# Patient Record
Sex: FEMALE | Race: WHITE | URBAN - METROPOLITAN AREA
[De-identification: names, ages, dates, MRNs, and addresses within clinical notes are randomized per-mention and may not be internally consistent; named-entity substitution may affect disease eponyms.]

---

## 2024-01-05 NOTE — H&P
[facility] - Gastroenterology                                                                                                               Reason for consult: eval    Requesting physician or provider: No primary care provider on file.    Chief Complaint   Patient presents with    Nausea    Abdominal Pain       HPI:   Chrissy Luna is a 28 year old year-old female without significant PMHx:     she is here today for evaluation right sided abd pain    Sx started approx last spring. Was after drinking beer and next day woke up w/ pain. Tried to change her diet and sx improved.    Started vitamins and made sx worse  Was having nausea in am and after fatty/greasy meals. No vomiting.  Went to ed in sept    Ct a/p w/o oral contrast 9/2023    Mild fecal material throughout the colon  No etiology for pts symptoms    Ultrasound 9/2023  Unremarkable ultrasound of uterus and ovaries    Labs 9/2023  Cbc, cmp, lipase unremarkable    Sx slightly improved. In dec started vit and pain returned  Pain in rlq.  Is intermittent  Improves with bm  Worse 1 h after eating  No dietary triggers.   Has nausea with pain  No vomiting.     she moves her bowels daily w/ straining at times. No diarrhea. No brbpr and/or melena.     she denies acid reflux and/or heartburn. she denies dysphagia, odynophagia and/or globus.  she denies recent change in appetite and/or unintentional weight loss.    NSAIDS: no  Tobacco: no  Alcohol: no  Marijuana: no  Illicit drugs: no    No FH GI malignancy, ibd, celiac    No history of adverse reaction to sedation  No AWILDA  No anticoagulants  No pacemaker/defibrillator  No pain medications and/or sleep aides      Last colonoscopy: no  Last EGD: no    Wt Readings from Last 6 Encounters:   01/11/24 169 lb (76.7 kg)        History, Medications, Allergies, ROS:      No past medical history on file.   Past Surgical History:   Procedure Laterality Date    APPENDECTOMY        Family  Hx: History reviewed. No pertinent family history.   Social History:   Social History     Socioeconomic History    Marital status: Single   Tobacco Use    Smoking status: Never    Smokeless tobacco: Never   Vaping Use    Vaping Use: Never used   Substance and Sexual Activity    Alcohol use: Never    Drug use: Never        Medications (Active prior to today's visit):  Current Outpatient Medications   Medication Sig Dispense Refill    PEG 3350-KCl-Na Bicarb-NaCl (TRILYTE) 420 g Oral Recon Soln Take prep as directed by gastro office. May substitute with Trilyte/generic equivalent if needed. 4000 mL 0       Allergies:  No Known Allergies    ROS:   CONSTITUTIONAL: negative for fevers, chills, sweats and weight loss  EYES Negative for red eyes, yellow eyes, changes in vision  HEENT: Negative for dysphagia and hoarseness  RESPIRATORY: Negative for cough and shortness of breath  CARDIOVASCULAR: Negative for chest pain, palpitations  GASTROINTESTINAL: See HPI  GENITOURINARY: Negative for dysuria and frequency  MUSCULOSKELETAL: Negative for arthralgias and myalgias  NEUROLOGICAL: Negative for dizziness and headaches  BEHAVIOR/PSYCH: Negative for anxiety and poor appetite    PHYSICAL EXAM:   Blood pressure (!) 139/91, pulse 84, height 5' 0.4\" (1.534 m), weight 169 lb (76.7 kg), last menstrual period 12/20/2023.    GEN: WD/WN, NAD  HEENT: Supple symmetrical, trachea midline  CV: RRR, the extremities are warm and well perfused   LUNGS: No increased work of breathing  ABDOMEN: No scars, normal bowel sounds, soft, non-tender, non-distended no rebound or guarding, no masses, no hepatomegaly  MSK: No redness, no warmth, no swelling of joints  SKIN: No jaundice, no erythema, no rashes  HEMATOLOGIC: No bleeding, no bruising  NEURO: Alert and interactive, normal gait    Labs/Imaging/Procedures:     Ct a/p 9/2023  Impression      1.   Punctate nonobstructing calculus in the left kidney.  2.   Otherwise unremarkable noncontrast  appearance of the kidneys and upper collecting systems. No  further calcification, dilation, or active obstructive uropathy.  3.  Mild nonspecific thickening of the urinary bladder wall. Please correlate for any possible cystitis.  4.  The appendix is not identified. No dominant inflammatory abnormality in this territory by this  technique.    SIGNED BY: Buster Pierce  9/19/2023 8:26 AM  Narrative    CT ABDOMEN & PELVIS WO IV CONTRAST    CLINICAL INDICATION: 27 years-old-Female; R flank pain    COMPARISON:  None available.    TECHNIQUE: Multidetector CT sections were obtained without administration of intravenous contrast.  Sagittal and coronal reconstructions were obtained. CT dose reduction techniques utilized.    FINDINGS:      Where applicable, Fleischner criteria and/or consensus guidelines (Moya et al. Radiology 2017) are  utilized or considered for follow-up recommendations.    PARTIALLY VISUALIZED LUNG BASES: No threshold suspicious abnormality..    BOWEL: Oral contrast not administered. No bowel dilation or obstruction. Mild fecal material throughout  the colon. I cannot confidently identify the appendix. No periappendiceal stranding in this territory..    ABDOMEN and PELVIS:    LIVER AND BILIARY SYSTEM: The liver is unremarkable. No intra or extrahepatic biliary ductal dilatation.    PANCREAS: Unremarkable.    SPLEEN: Unremarkable.    ADRENAL GLANDS: Unremarkable.    GENITOURINARY ORGANS: Noncontrast evaluation. 3 mm nonobstructing calculus in the lower pole of the left  kidney. No collecting system dilation on either side. No active obstructive uropathy by CT.    Mild  nonspecific thickening of the urinary bladder wall may be exaggerated by underdistention, nonspecific.    PELVIS: No further significant findings.    VASCULATURE: No significant abnormality for technique.    LYMPH NODES, MESENTERY, ABDOMINAL SPACES: No further significant findings beyond any described above.    BONES: No further  significant findings.    Ultrasound abdomen 9/2023:  Impression    1. Unremarkable sonographic appearance of the uterus.  2. Unremarkable sonographic appearance of the ovaries.    SIGNED BY: Keith Go MD  9/19/2023 11:08 AM  Narrative    US PELVIC NON-OBSTETRIC, US NON-OB TRANSVAGINAL, US ABDOMEN DOPPLER COMPLETE    CLINICAL INDICATION: 27 years-old-Female; right flank pain and pelvic pain.    COMPARISON:  None available.    TECHNIQUE:  Transabdominal and transvaginal pelvic ultrasound was performed using multiple curved array  probes. Color and spectral Doppler imaging was performed of the bilateral ovaries.    FINDINGS:  The uterus measures 7.1 x 2.0 x 4.2 cm.  There are no focal myometrial lesions.  There is no  asymmetric flow on spontaneous color flow imaging within the myometrial tissue.  The endometrial complex  is normal in thickness.  On transabdominal evaluation the endometrium measures 0.7 cm.  Endovaginally,  the endometrium measures 0.8 cm. A nabothian cyst measuring up to 8 mm is noted    RIGHT OVARY: Transabdominally, the RIGHT ovary measures 2.1 x 1.4 x 2.4 cm. Endovaginally, the RIGHT  ovary measures 3.8 x 2.1 x 3.3 cm. The right ovary demonstrates an unremarkable sonographic appearance.    LEFT OVARY: Transabdominally, the LEFT ovary measures 3.0 x 1.7 x 2.6 cm. Endovaginally, the LEFT ovary  measures 2.5 x 2.3 x 2.2 cm. The left ovary demonstrates an unremarkable sonographic appearance.    Normal appearing follicles are identified in both ovaries. There is spontaneous color flow overlying both  ovaries. Arterial and venous waveforms are noted in the bilateral ovaries on spectral Doppler imaging.    No adnexal masses are identified.  There is no free fluid.  The partially visualized urinary bladder,  which is incidentally imaged, is unremarkable.    ASSESSMENT/PLAN:   Chrissy Luna is a 28 year old year-old female without significant PMHx:     #right sided abd  pain  #constipation  #nausea  Here today for eval of her rlq abd pain. Ct, ultrasound pevis, labs w/o significant finding. S/p appy age 13. Has constipation, nausea and think pain may be related.  No vomiting, unintentional weight loss, brbpr and/or melena. No fhx gi malignancy, ibd, celiac.     -hp testing  -fibercon or citrucel in pm  -miralax in am and adjust as needed    1. Schedule colonoscopy with iv or MAC w/ Dr. Juan or Dr. Morton [Diagnosis: #right sided abd pain  #constipation  #nausea]    2.  bowel prep from pharmacy (split trilyte)    3. Continue all medications as normal for your procedure.    4. Read all bowel prep instructions carefully. Bowel prep instructions can also be found online at:  [email or web address]     5. AVOID seeds, nuts, popcorn, raw fruits and vegetables for 3 days before procedure    6. You MAY need to go for COVID testing 72 hours before procedure. The testing team will call you a few days before your procedure to discuss with you if testing is required. If you are asked to go for COVID testing and do not completed the test, the procedure cannot be performed.     7. If you start any NEW medication after your visit today, please notify us. Certain medications (like iron or weight loss medications) will need to be held before the procedure, or the procedure cannot be performed safely.      Orders This Visit:  Orders Placed This Encounter   Procedures    Helicobacter Pylori Breath Test, Adult       Meds This Visit:  Requested Prescriptions     Signed Prescriptions Disp Refills    PEG 3350-KCl-Na Bicarb-NaCl (TRILYTE) 420 g Oral Recon Soln 4000 mL 0     Sig: Take prep as directed by gastro office. May substitute with Trilyte/generic equivalent if needed.       Imaging & Referrals:  None    ENDOSCOPIC RISK BENEFIT DISCUSSION: I described the procedure in great detail with the patient. I discussed the risks and benefits, including but not limited to: bleeding, perforation,  infection, anesthesia complications, and even death. Patient will be NPO after midnight and will have a person physically present at time of pick-up to drive patient home. Patient verbalized understanding and agrees to proceed with procedure as planned.    Keila Magana, APRN   1/11/2024        This note was partially prepared using Dragon Medical voice recognition dictation software. As a result, errors may occur. When identified, these errors have been corrected. While every attempt is made to correct errors during dictation, discrepancies may still exist.

## 2024-01-11 ENCOUNTER — OFFICE VISIT (OUTPATIENT)
Dept: GASTROENTEROLOGY | Facility: CLINIC | Age: 29
End: 2024-01-11

## 2024-01-11 VITALS
BODY MASS INDEX: 32.75 KG/M2 | SYSTOLIC BLOOD PRESSURE: 139 MMHG | WEIGHT: 169 LBS | DIASTOLIC BLOOD PRESSURE: 91 MMHG | HEART RATE: 84 BPM | HEIGHT: 60.4 IN

## 2024-01-11 DIAGNOSIS — R11.0 NAUSEA: ICD-10-CM

## 2024-01-11 DIAGNOSIS — R10.31 RIGHT LOWER QUADRANT PAIN: Primary | ICD-10-CM

## 2024-01-11 DIAGNOSIS — K59.00 CONSTIPATION, UNSPECIFIED CONSTIPATION TYPE: ICD-10-CM

## 2024-01-11 PROCEDURE — 3008F BODY MASS INDEX DOCD: CPT | Performed by: NURSE PRACTITIONER

## 2024-01-11 PROCEDURE — 3075F SYST BP GE 130 - 139MM HG: CPT | Performed by: NURSE PRACTITIONER

## 2024-01-11 PROCEDURE — 99204 OFFICE O/P NEW MOD 45 MIN: CPT | Performed by: NURSE PRACTITIONER

## 2024-01-11 PROCEDURE — 3080F DIAST BP >= 90 MM HG: CPT | Performed by: NURSE PRACTITIONER

## 2024-01-11 RX ORDER — POLYETHYLENE GLYCOL 3350, SODIUM CHLORIDE, SODIUM BICARBONATE, POTASSIUM CHLORIDE 420; 11.2; 5.72; 1.48 G/4L; G/4L; G/4L; G/4L
POWDER, FOR SOLUTION ORAL
Qty: 4000 ML | Refills: 0 | Status: SHIPPED | OUTPATIENT
Start: 2024-01-11

## 2024-01-11 NOTE — PATIENT INSTRUCTIONS
-hp testing  -miralax in am and adjust as needed  -fibercon or citrucel    1. Schedule colonoscopy with iv or MAC w/ Dr. Juan or Dr. Morton [Diagnosis: #right sided abd pain  #constipation  #nausea]    2.  bowel prep from pharmacy (split trilyte)    3. Continue all medications as normal for your procedure.    4. Read all bowel prep instructions carefully. Bowel prep instructions can also be found online at:  [email or web address]     5. AVOID seeds, nuts, popcorn, raw fruits and vegetables for 3 days before procedure    6. You MAY need to go for COVID testing 72 hours before procedure. The testing team will call you a few days before your procedure to discuss with you if testing is required. If you are asked to go for COVID testing and do not completed the test, the procedure cannot be performed.     7. If you start any NEW medication after your visit today, please notify us. Certain medications (like iron or weight loss medications) will need to be held before the procedure, or the procedure cannot be performed safely.

## 2024-01-12 ENCOUNTER — TELEPHONE (OUTPATIENT)
Facility: CLINIC | Age: 29
End: 2024-01-12

## 2024-01-12 NOTE — TELEPHONE ENCOUNTER
Spoke to patient    She wanted to know why the lab work was ordered.  I explained Keila's recommendations from the OV yesterday.    I discussed how to prepare for the H pylori test, where to get it done, and reasons why we test for it.    Pt verbalized understanding.  Pt had no further questions

## 2024-01-15 ENCOUNTER — TELEPHONE (OUTPATIENT)
Facility: CLINIC | Age: 29
End: 2024-01-15

## 2024-01-15 DIAGNOSIS — R10.31 RIGHT LOWER QUADRANT PAIN: Primary | ICD-10-CM

## 2024-01-15 NOTE — TELEPHONE ENCOUNTER
Scheduled for:  Colonoscopy [id number], [id number]  Provider Name:    Date: 4/16/2024   Location: [facility]   Sedation:  MAC  Time:  2:00 pm ,(pt is aware that [facility] will call the day before to confirm arrival time)  Prep: Trilyte   Meds/Allergies Reconciled?: Keila/NP reviewed.  Diagnosis with codes:  Lower Right Side Pain R10.31, Constipation K59.00, Nausea R11.0  Was patient informed to call insurance with codes (Y/N):  Yes   Referral sent?:  Referral was sent at the time of electronic surgical scheduling.  [facility] or [facility] notified?:   Electronic case request was sent to [facility] via [employer].  Medication Orders:  N/A  Misc Orders:  N/A     Further instructions given by staff:  I discussed the prep instructions with the patient which she verbally understood. Provided patient with prep instructions and cancellation policy at the time of office visit.

## 2024-02-07 ENCOUNTER — TELEPHONE (OUTPATIENT)
Facility: CLINIC | Age: 29
End: 2024-02-07

## 2024-02-07 ENCOUNTER — LAB ENCOUNTER (OUTPATIENT)
Dept: LAB | Age: 29
End: 2024-02-07
Attending: NURSE PRACTITIONER
Payer: COMMERCIAL

## 2024-02-07 DIAGNOSIS — K59.00 CONSTIPATION, UNSPECIFIED CONSTIPATION TYPE: ICD-10-CM

## 2024-02-07 DIAGNOSIS — R11.0 NAUSEA: ICD-10-CM

## 2024-02-07 DIAGNOSIS — R10.31 RIGHT LOWER QUADRANT PAIN: ICD-10-CM

## 2024-02-07 PROCEDURE — 83013 H PYLORI (C-13) BREATH: CPT

## 2024-02-07 NOTE — TELEPHONE ENCOUNTER
Keila,   Pt is scheduled for a colon on 4/16/24. Pt asking to have EGD added r/t abdominal pain. Are you agreeable?  Thanks,  Lisa    My Note Signed3:18 PM     Addend     Delete     Copy     From: Chrissy Luna  To: Keila Magana  Sent: 2/6/2024  9:40 PM CST  Subject: Additional test- gastroscopy    I would like to found out if I can benefit to do gastroscopy at the same time during the colonoscopy. I feel stomach pains sometimes. Please let me know . Thank you.

## 2024-02-08 LAB — H PYLORI BREATH TEST: POSITIVE

## 2024-02-12 ENCOUNTER — TELEPHONE (OUTPATIENT)
Dept: GASTROENTEROLOGY | Facility: CLINIC | Age: 29
End: 2024-02-12

## 2024-02-12 ENCOUNTER — TELEPHONE (OUTPATIENT)
Facility: CLINIC | Age: 29
End: 2024-02-12

## 2024-02-12 ENCOUNTER — PATIENT MESSAGE (OUTPATIENT)
Dept: GASTROENTEROLOGY | Facility: CLINIC | Age: 29
End: 2024-02-12

## 2024-02-12 DIAGNOSIS — A04.8 HELICOBACTER PYLORI (H. PYLORI) INFECTION: Primary | ICD-10-CM

## 2024-02-12 RX ORDER — CLARITHROMYCIN 500 MG/1
500 TABLET, COATED ORAL 2 TIMES DAILY
Qty: 28 TABLET | Refills: 0 | Status: SHIPPED | OUTPATIENT
Start: 2024-02-12 | End: 2024-02-26

## 2024-02-12 RX ORDER — AMOXICILLIN 500 MG/1
1000 TABLET, FILM COATED ORAL 2 TIMES DAILY
Qty: 56 TABLET | Refills: 0 | Status: SHIPPED | OUTPATIENT
Start: 2024-02-12 | End: 2024-02-26

## 2024-02-12 RX ORDER — AMOXICILLIN 500 MG/1
1000 TABLET, FILM COATED ORAL 2 TIMES DAILY
Qty: 56 TABLET | Refills: 0 | Status: SHIPPED | OUTPATIENT
Start: 2024-02-12 | End: 2024-02-12

## 2024-02-12 RX ORDER — CLARITHROMYCIN 500 MG/1
500 TABLET, COATED ORAL 2 TIMES DAILY
Qty: 28 TABLET | Refills: 0 | Status: SHIPPED | OUTPATIENT
Start: 2024-02-12 | End: 2024-02-12

## 2024-02-12 RX ORDER — OMEPRAZOLE 20 MG/1
20 CAPSULE, DELAYED RELEASE ORAL
Qty: 28 CAPSULE | Refills: 0 | Status: SHIPPED | OUTPATIENT
Start: 2024-02-12 | End: 2024-02-26

## 2024-02-12 RX ORDER — OMEPRAZOLE 20 MG/1
20 CAPSULE, DELAYED RELEASE ORAL
Qty: 28 CAPSULE | Refills: 0 | Status: SHIPPED | OUTPATIENT
Start: 2024-02-12 | End: 2024-02-12

## 2024-02-12 NOTE — TELEPHONE ENCOUNTER
From: Chrissy Luna  To: Keila Magana  Sent: 2/12/2024 10:43 AM CST  Subject: Helicobacter Pilory test result     Hi. I have a positive test on my helicobacter breath test but I still haven’t received any prescriptions from you. I feel not good and I would like to start my treatment as soon as possible. Looking forward to hearing from you soon

## 2024-02-12 NOTE — TELEPHONE ENCOUNTER
Recall h pylori eradication testing in 8 weeks per APN. Message sent to pt outreach.    RN called and spoke to pt. Discussed ordered meds; all were sent to [facility] in [place] so RN changed orders to local [facility]. Informed pt that we will contact her in approx 8 weeks when due for eradication testing. Pt verbalized understanding.

## 2024-02-12 NOTE — TELEPHONE ENCOUNTER
See TE from 2/12/24 r/t h pylori treatment. Pt states she spoke to APN regarding adding EGD to already scheduled colonoscopy; pt states Keila recommends assessing symptoms after h pylori treatment is completed before deciding to add EGD or not.

## 2024-02-12 NOTE — TELEPHONE ENCOUNTER
I spoke with the pt-plan to treat hp and complete eradication testing as ordered.  If on-going sx after confirmation of eradication consider need for egd.  Pt is in agreement with the plan

## 2024-02-21 ENCOUNTER — PATIENT MESSAGE (OUTPATIENT)
Dept: GASTROENTEROLOGY | Facility: CLINIC | Age: 29
End: 2024-02-21

## 2024-02-22 NOTE — TELEPHONE ENCOUNTER
From: Chrissy Luna  To: Keila Magana  Sent: 2/21/2024 6:41 PM CST  Subject: After taking medication feel not good     Hi. I started taking medication 4 days ago. I feel bitter taste in my mouth all the time, nausea , weakness and my poop is yellow. Is it normal for people who is be treated like this or no?

## 2024-02-22 NOTE — TELEPHONE ENCOUNTER
Ravi Pedraza,     Please advise below.    Spoke with patient, name/ verified.    Stated she started to triple therapy for h. Pylori last Saturday.     Last Monday, she woke up w/ muscle weakness, denies dizziness.    Denies any ill contacts    Has normal soft BM 3x/day, denies brbpr/melena, stools are yellow and I informed pt that this could be side effects from the antibiotics along w/ nausea. She denies vomiting.  Pt's normal bm is once a day prior, recommended taking OTC probiotics while on this triple therapy.      Has stomach upset- instructed pt to take antibiotics w/ food to minimize stomach upset.     Denies, fever/chills, chest pain, shortness of breath or any other symptoms.    Instructed to take fluids for adequate hydration.     In the interim, if symptoms get worse, instructed pt to call us back or go to ER.     Pt verbalized understanding.     Of note, instructed pt to call GI office vs sending [employer] message when having symptoms for immediate attention.

## 2024-02-22 NOTE — TELEPHONE ENCOUNTER
I contacted the pt.     She reports fatigue with therapy.  No instability, dizziness. No rash. No sob.   No vomiting, diarrhea, brbpr, melena.    She is tolerating gen diet and fluids.       We discussed considering alternate treatment regimen, however most contain metronidazole and tends to cause nausea, abd discomfort.    She is electing to continue current therapy regimen.    Plan:  Probiotic  Continue triple therapy with food  Stop therapy and contact office vs consider er if condition decline    She verbalizes understanding and is in agreement with the plan.

## 2024-03-12 ENCOUNTER — PATIENT MESSAGE (OUTPATIENT)
Dept: GASTROENTEROLOGY | Facility: CLINIC | Age: 29
End: 2024-03-12

## 2024-03-12 DIAGNOSIS — R14.0 BLOATING: Primary | ICD-10-CM

## 2024-03-12 DIAGNOSIS — R10.84 GENERALIZED ABDOMINAL PAIN: ICD-10-CM

## 2024-03-12 NOTE — TELEPHONE ENCOUNTER
From: Chrissy Luna  To: Keila Magana  Sent: 3/12/2024 4:02 PM CDT  Subject: Pain in stomach after medication     Hello! I finished taking medication against h.pylory March 1st. After that I started feeling pain in my stomach , bloating after every meal, burping . What should I do?

## 2024-03-28 ENCOUNTER — TELEPHONE (OUTPATIENT)
Facility: CLINIC | Age: 29
End: 2024-03-28

## 2024-03-28 NOTE — TELEPHONE ENCOUNTER
1st,overdue reminder letter mailed out to patient   Lab order :  Orders Placed on 2/12/2024   Due 8 weeks ( 4/12/2024)  Helicobacter Pylori Breath Test, Adult

## 2024-03-29 ENCOUNTER — NURSE ONLY (OUTPATIENT)
Dept: LAB | Age: 29
End: 2024-03-29
Attending: NURSE PRACTITIONER
Payer: COMMERCIAL

## 2024-03-29 DIAGNOSIS — A04.8 HELICOBACTER PYLORI (H. PYLORI) INFECTION: ICD-10-CM

## 2024-03-29 PROCEDURE — 83013 H PYLORI (C-13) BREATH: CPT

## 2024-03-31 LAB — H PYLORI BREATH TEST: NEGATIVE

## 2024-04-01 ENCOUNTER — TELEPHONE (OUTPATIENT)
Dept: GASTROENTEROLOGY | Facility: CLINIC | Age: 29
End: 2024-04-01

## 2024-04-01 NOTE — TELEPHONE ENCOUNTER
Hp neg 3/29/24 after treatment.     She reports rlq pain worse after eating.  Plan for c-scope as scheduled.

## 2024-04-15 ENCOUNTER — TELEPHONE (OUTPATIENT)
Facility: CLINIC | Age: 29
End: 2024-04-15

## 2024-04-15 NOTE — TELEPHONE ENCOUNTER
RN called and spoke to pt. Pt received printed script for bowel prep. Pt states she brought the script to pharmacy and they told her they don't have that medication available.     RN e-scribed 4 L bowel prep to [facility] in [place]. Instructed pt to call GI office if she has any issues getting med from pharmacy today. Verified that pt has written instructions on how to prepare for procedure.

## 2024-04-23 ENCOUNTER — HOSPITAL ENCOUNTER (OUTPATIENT)
Dept: ULTRASOUND IMAGING | Age: 29
Discharge: HOME | End: 2024-04-23
Attending: NURSE PRACTITIONER
Payer: COMMERCIAL

## 2024-04-23 ENCOUNTER — TELEPHONE (OUTPATIENT)
Dept: GASTROENTEROLOGY | Facility: CLINIC | Age: 29
End: 2024-04-23

## 2024-04-23 DIAGNOSIS — R14.0 BLOATING: ICD-10-CM

## 2024-04-23 DIAGNOSIS — R10.84 GENERALIZED ABDOMINAL PAIN: ICD-10-CM

## 2024-04-23 DIAGNOSIS — N20.0 NEPHROLITHIASIS: Primary | ICD-10-CM

## 2024-04-23 PROCEDURE — 76700 US EXAM ABDOM COMPLETE: CPT | Performed by: NURSE PRACTITIONER

## 2024-04-23 NOTE — TELEPHONE ENCOUNTER
Ultrasound abd complete 4/23/24:    Impression  CONCLUSION:  Mild right hydronephrosis.  Otherwise no acute sonographic findings.  Consider further evaluation with CT, as clinically indicated.     LOCATION:  [facility]     Dictated by (CST): Andrews Ibrahim MD on 4/23/2024 at 8:09 AM      Renal function 2/2024 normal    She denies flank pain, change in urination, fever/chills.  Mild, transient abd pain and think could be related  She does not have a primary care.    I advised repeat lab testing and recommended she elect a PCP.  She was given a referral to urology for f/u and advised she schedule consult as soon as possible for further work-up. Er if condition decline    She verbalizes understanding and is in agreement with the plan.

## 2024-04-24 ENCOUNTER — LAB ENCOUNTER (OUTPATIENT)
Dept: LAB | Age: 29
End: 2024-04-24
Attending: NURSE PRACTITIONER
Payer: COMMERCIAL

## 2024-04-24 DIAGNOSIS — N20.0 NEPHROLITHIASIS: ICD-10-CM

## 2024-04-24 LAB
ALBUMIN SERPL-MCNC: 4.4 G/DL (ref 3.2–4.8)
ALBUMIN/GLOB SERPL: 1.6 {RATIO} (ref 1–2)
ALP LIVER SERPL-CCNC: 56 U/L
ALT SERPL-CCNC: 14 U/L
ANION GAP SERPL CALC-SCNC: 6 MMOL/L (ref 0–18)
AST SERPL-CCNC: 20 U/L (ref ?–34)
BASOPHILS # BLD AUTO: 0.04 X10(3) UL (ref 0–0.2)
BASOPHILS NFR BLD AUTO: 0.7 %
BILIRUB SERPL-MCNC: 0.3 MG/DL (ref 0.3–1.2)
BUN BLD-MCNC: 10 MG/DL (ref 9–23)
BUN/CREAT SERPL: 13.2 (ref 10–20)
CALCIUM BLD-MCNC: 9.2 MG/DL (ref 8.7–10.4)
CHLORIDE SERPL-SCNC: 107 MMOL/L (ref 98–112)
CO2 SERPL-SCNC: 27 MMOL/L (ref 21–32)
CREAT BLD-MCNC: 0.76 MG/DL
DEPRECATED RDW RBC AUTO: 37.1 FL (ref 35.1–46.3)
EGFRCR SERPLBLD CKD-EPI 2021: 108 ML/MIN/1.73M2 (ref 60–?)
EOSINOPHIL # BLD AUTO: 0.09 X10(3) UL (ref 0–0.7)
EOSINOPHIL NFR BLD AUTO: 1.6 %
ERYTHROCYTE [DISTWIDTH] IN BLOOD BY AUTOMATED COUNT: 12.1 % (ref 11–15)
FASTING STATUS PATIENT QL REPORTED: YES
GLOBULIN PLAS-MCNC: 2.7 G/DL (ref 2.8–4.4)
GLUCOSE BLD-MCNC: 92 MG/DL (ref 70–99)
HCT VFR BLD AUTO: 38.8 %
HGB BLD-MCNC: 13 G/DL
IMM GRANULOCYTES # BLD AUTO: 0.01 X10(3) UL (ref 0–1)
IMM GRANULOCYTES NFR BLD: 0.2 %
LYMPHOCYTES # BLD AUTO: 2.8 X10(3) UL (ref 1–4)
LYMPHOCYTES NFR BLD AUTO: 49.7 %
MCH RBC QN AUTO: 28.6 PG (ref 26–34)
MCHC RBC AUTO-ENTMCNC: 33.5 G/DL (ref 31–37)
MCV RBC AUTO: 85.5 FL
MONOCYTES # BLD AUTO: 0.49 X10(3) UL (ref 0.1–1)
MONOCYTES NFR BLD AUTO: 8.7 %
NEUTROPHILS # BLD AUTO: 2.2 X10 (3) UL (ref 1.5–7.7)
NEUTROPHILS # BLD AUTO: 2.2 X10(3) UL (ref 1.5–7.7)
NEUTROPHILS NFR BLD AUTO: 39.1 %
OSMOLALITY SERPL CALC.SUM OF ELEC: 289 MOSM/KG (ref 275–295)
PLATELET # BLD AUTO: 327 10(3)UL (ref 150–450)
POTASSIUM SERPL-SCNC: 4.2 MMOL/L (ref 3.5–5.1)
PROT SERPL-MCNC: 7.1 G/DL (ref 5.7–8.2)
RBC # BLD AUTO: 4.54 X10(6)UL
SODIUM SERPL-SCNC: 140 MMOL/L (ref 136–145)
WBC # BLD AUTO: 5.6 X10(3) UL (ref 4–11)

## 2024-04-24 PROCEDURE — 36415 COLL VENOUS BLD VENIPUNCTURE: CPT

## 2024-04-24 PROCEDURE — 85025 COMPLETE CBC W/AUTO DIFF WBC: CPT

## 2024-04-24 PROCEDURE — 80053 COMPREHEN METABOLIC PANEL: CPT

## 2024-04-26 ENCOUNTER — OFFICE VISIT (OUTPATIENT)
Dept: SURGERY | Facility: CLINIC | Age: 29
End: 2024-04-26
Payer: COMMERCIAL

## 2024-04-26 DIAGNOSIS — R35.0 URINARY FREQUENCY: ICD-10-CM

## 2024-04-26 DIAGNOSIS — N13.30 HYDRONEPHROSIS, UNSPECIFIED HYDRONEPHROSIS TYPE: Primary | ICD-10-CM

## 2024-04-26 DIAGNOSIS — R31.29 MICROHEMATURIA: ICD-10-CM

## 2024-04-26 LAB
APPEARANCE: CLEAR
BILIRUBIN: NEGATIVE
GLUCOSE (URINE DIPSTICK): NEGATIVE MG/DL
KETONES (URINE DIPSTICK): NEGATIVE MG/DL
LEUKOCYTES: NEGATIVE
MULTISTIX LOT#: ABNORMAL NUMERIC
NITRITE, URINE: NEGATIVE
PH, URINE: 6.5 (ref 4.5–8)
PROTEIN (URINE DIPSTICK): NEGATIVE MG/DL
SPECIFIC GRAVITY: 1.01 (ref 1–1.03)
URINE-COLOR: YELLOW
UROBILINOGEN,SEMI-QN: 0.2 MG/DL (ref 0–1.9)

## 2024-04-26 PROCEDURE — 99204 OFFICE O/P NEW MOD 45 MIN: CPT

## 2024-04-26 PROCEDURE — 81003 URINALYSIS AUTO W/O SCOPE: CPT

## 2024-04-26 NOTE — PROGRESS NOTES
[facility]    Urology Consult Note    History of Present Illness:   Patient is a(n) 28 year old female who presents for right hydronephrosis.    Pt reports increasing right flank pain in the past 6 mos with urinary frequency q2-3h. Urinary frequency worsens after dinner before bed to q30min or so. Nocturia only 1x/night. Her baseline is q3-4h. She feels flank pain worsens when she took prep recently for colonoscopy.     She was first seen about this 9/2023. At the time, presented with right flank pain with radiation into the RLQ. CT A/P done at this time showed punctate nonobstructing stones in the left kidney. No dilation or obstruction noted at that time.     She was found to have UTI 1.5mos ago with obgyne and tx with abx although I cannot see any culture results.     Most recent US abd ordered by PCP showed mild right sided hydro. Currently still having persistent pain and urinary frequency.     UA today trace intact blood.     Drinks 2L water a day, 1L black tea, a couple sodas per wk. No fhx of  cx. No personal hx of passing stones. No fhx of stones. Never smoker.   HISTORY:  No past medical history on file.   Past Surgical History:   Procedure Laterality Date    Appendectomy      Colonoscopy N/A 4/16/2024    Procedure: COLONOSCOPY;  Surgeon: Marcio Morton MD;  Location: [facility] MAIN OR      No family history on file.   Social History:   Social History     Socioeconomic History    Marital status: Single   Tobacco Use    Smoking status: Never    Smokeless tobacco: Never   Vaping Use    Vaping status: Never Used   Substance and Sexual Activity    Alcohol use: Never    Drug use: Never     Social Determinants of Health     Financial Resource Strain: Low Risk  (4/24/2024)    Received from [facility]    Overall Financial Resource Strain (CARDIA)     Difficulty of Paying Living Expenses: Not very hard   Food Insecurity: No Food Insecurity (4/24/2024)     Received from [facility]    Hunger Vital Sign     Worried About Running Out of Food in the Last Year: Never true     Ran Out of Food in the Last Year: Never true   Transportation Needs: No Transportation Needs (4/24/2024)    Received from [facility]    PRAPARE - Transportation     Lack of Transportation (Medical): No     Lack of Transportation (Non-Medical): No   Housing Stability: Low Risk  (4/24/2024)    Received from [facility]    Housing Stability Vital Sign     Unable to Pay for Housing in the Last Year: No     Number of Places Lived in the Last Year: 1     In the last 12 months, was there a time when you did not have a steady place to sleep or slept in a shelter (including now)?: No        Allergies  No Known Allergies    Review of Systems:   A 10-point review of systems was completed and is negative other than as noted above.    Physical Exam:   [facility] 03/28/2024     GENERAL APPEARANCE: no acute distress  NEUROLOGIC: converses appropriately  HEAD: atraumatic, normocephalic  LUNGS: non-labored breathing  ABDOMEN: soft, nontender, non-distended  BACK: no CVA tenderness  PSYCH: appropriate affect and mood    Results:     Laboratory Data:  Lab Results   Component Value Date    WBC 5.6 04/24/2024    HGB 13.0 04/24/2024    .0 04/24/2024     Lab Results   Component Value Date     04/24/2024    K 4.2 04/24/2024     04/24/2024    CO2 27.0 04/24/2024    BUN 10 04/24/2024    GLU 92 04/24/2024    AST 20 04/24/2024    ALT 14 04/24/2024    TP 7.1 04/24/2024    ALB 4.4 04/24/2024    CA 9.2 04/24/2024       Urinalysis Results (last 3 years):  Recent Labs     04/26/24  0918   SPECGRAVITY 1.010   PHURINE 6.5   NITRITE Negative       Urine Culture Results (last 3 years):  No results found for: \"URINECUL\"    Imaging  US ABDOMEN COMPLETE (CPT=76700)    Result Date: 4/23/2024  PROCEDURE:  US ABDOMEN COMPLETE (CPT=76700)  COMPARISON:  None.   INDICATIONS:  R10.84 Generalized abdominal pain R14.0 Bloating  TECHNIQUE:  Real time gray-scale ultrasound was used to evaluate the abdomen.  The exam includes images of the liver, gallbladder, common bile duct, pancreas, spleen, kidneys, IVC, and aorta.  PATIENT STATED HISTORY: (As transcribed by Technologist)  Patient states one year of bloating and right upper quadrant pain.    FINDINGS:  LIVER:  Normal size and echogenicity. No significant masses. BILIARY:  Normal appearing gallbladder, intrahepatic ducts, and common bile duct.  Common bile duct diameter is 3.3 mm. PANCREAS:  Views obscured by bowel gas SPLEEN:  Normal. KIDNEYS:  The right kidney measures 9.5 centimeters and demonstrates mild hydronephrosis.  The left kidney measures 11 centimeters and is within normal limits. AORTA/IVC:  Normal.             CONCLUSION:  Mild right hydronephrosis.  Otherwise no acute sonographic findings.  Consider further evaluation with CT, as clinically indicated.   LOCATION:  [facility]    Dictated by (CST): Andrews Ibrahim MD on 4/23/2024 at 8:09 AM     Finalized by (CST): Andrews Ibrahim MD on 4/23/2024 at 8:11 AM           Impression:   Recommendations:  Hydronephrosis  - CTU to further characterize hydro  - PCR urine culture to r/o persisting infxn    Microhematuria  - microscopic analysis today  - if pos, discussed need for CTU and cysto,    Urinary frequency  - discussed dietery modifications such as drinking at least 40-60 oz water per day or enough to keep urine clear and to avoid dietary irritants such as coffee, tea, carbonated drinks, soda, juice, spicy, and citrus  - discussed behavioral modifications such as double voiding, bending over after void to completely empty, and post coital hygiene    Thank you very much for this consult. Please call if there are any questions or concerns.     Tressa Moreno PA-C  Urology  [facility]  Phone: [phone]    Date: 4/26/2024  Time: 9:24 AM

## 2024-05-02 ENCOUNTER — TELEPHONE (OUTPATIENT)
Dept: SURGERY | Facility: CLINIC | Age: 29
End: 2024-05-02

## 2024-05-02 RX ORDER — AMOXICILLIN AND CLAVULANATE POTASSIUM 875; 125 MG/1; MG/1
1 TABLET, FILM COATED ORAL 2 TIMES DAILY
Qty: 14 TABLET | Refills: 0 | Status: SHIPPED | OUTPATIENT
Start: 2024-05-02

## 2024-05-02 NOTE — TELEPHONE ENCOUNTER
>100k citrobacter koseri, sending augmentin, RTC if sx not improving. Still need to get CTU done for hydro.      Augmentin>fosfomycin>macrobid

## 2024-05-07 ENCOUNTER — HOSPITAL ENCOUNTER (OUTPATIENT)
Dept: CT IMAGING | Age: 29
Discharge: HOME | End: 2024-05-07
Payer: COMMERCIAL

## 2024-05-07 DIAGNOSIS — N13.30 HYDRONEPHROSIS, UNSPECIFIED HYDRONEPHROSIS TYPE: ICD-10-CM

## 2024-05-07 PROCEDURE — 76377 3D RENDER W/INTRP POSTPROCES: CPT

## 2024-05-07 PROCEDURE — 74178 CT ABD&PLV WO CNTR FLWD CNTR: CPT

## 2024-06-25 NOTE — TELEPHONE ENCOUNTER
Hp pos  H.pylori gastric infection identified on a test. The natural history of H.pylori was reviewed with the patient, as well as possible complications from H.pylori including stomach cancer, gastritis, dyspepsia, anemia and ulcers. We discussed treatment options and rationale for treatment, as well as testing for eradication. The patient understands the risks/benefits/side-effects of treatment and wants to proceed. The patient understands only 80-95% of patients have eradication to initial course of abx, and therefore may need another course of abx if fails first treatment. There may be side-effects during treatment and patient should call if any problems.      Plan:  Will start w/triple therapy (PPI + amoxicillin + clarithromycin) x 14 days.   I have instructed patient to check repeat H.pylori test in 8-10 weeks to ensure eradication (order placed).      Ongoing long-term maintenance.  Doing well.

## (undated) NOTE — LETTER
3/28/2024              Chrissy Luna        [address]        [place] [address]         Dear Chrissy,    Our records indicate that the tests ordered for you by PADMINI Barron  have not been done.  If you have, in fact, already completed the tests or you do not wish to have the tests done, please contact our office at THE NUMBER LISTED BELOW.  Otherwise, please proceed with the testing. Please call Central Scheduling at [phone] to schedule this test .  Lab order :  Orders Placed on 2/12/2024   Due 8 weeks ( 4/12/2024)  Helicobacter Pylori Breath Test, Adult - Please go to the lab to complete this test. Make sure you do not take a PPI (omeprazole/pantoprazole) or H2 blocker (famotidine) medication for 2 weeks prior to the test as this could result in a false negative result. Also do not eat one hour prior to the test.  H-pylori breath test     This test requires the adult patient (>17 years of age) to fast for 1 hour prior to test administration. The patient should not have taken antibiotics, proton pump inhibitors (e.g., Prilosec, Prevacid, Aciphex, Nexium), or bismuth preparations (e.g., Pepto-Bismol) within the previous 14 days.   The effect of histamine 2-receptor antogonists (e.g., Axid, Pepcid, Tagamet, Zantac) may reduce urease activity on urea breath tests and should be discontinued for 24-48 hours before the sample is collected.   When used to monitor treatment, the test should be performed four weeks after cessation of definitive therapy. The patient should be informed that the Pranactin-Citric drink that will be administered contains phenylalanine. Phenylketonurics restrict dietary phenylalanine.       Sincerely,  PADMINI Barron  [facility]  [address]  [place] [id number] [phone]